# Patient Record
Sex: MALE | Race: WHITE | Employment: FULL TIME | URBAN - METROPOLITAN AREA
[De-identification: names, ages, dates, MRNs, and addresses within clinical notes are randomized per-mention and may not be internally consistent; named-entity substitution may affect disease eponyms.]

---

## 2023-01-17 ENCOUNTER — OFFICE VISIT (OUTPATIENT)
Dept: SURGERY | Facility: CLINIC | Age: 48
End: 2023-01-17

## 2023-01-17 VITALS — WEIGHT: 185 LBS | HEIGHT: 76 IN | BODY MASS INDEX: 22.53 KG/M2

## 2023-01-17 DIAGNOSIS — K40.90 RIGHT INGUINAL HERNIA: Primary | ICD-10-CM

## 2023-01-17 PROCEDURE — 3008F BODY MASS INDEX DOCD: CPT | Performed by: SURGERY

## 2023-01-17 PROCEDURE — 99204 OFFICE O/P NEW MOD 45 MIN: CPT | Performed by: SURGERY

## 2023-01-17 NOTE — PATIENT INSTRUCTIONS
Obtain preoperative testing. Plan robotic right possible left inguinal hernia repair with mesh at [facility] AND [facility] same-day surgery. Avoid aspirin and NSAIDs for 5 days prior to surgery.   Same-day surgery will call the day before with instructions

## 2023-01-26 ENCOUNTER — LAB ENCOUNTER (OUTPATIENT)
Dept: LAB | Age: 48
End: 2023-01-26
Payer: COMMERCIAL

## 2023-01-26 ENCOUNTER — EKG ENCOUNTER (OUTPATIENT)
Dept: LAB | Age: 48
End: 2023-01-26
Payer: COMMERCIAL

## 2023-01-26 DIAGNOSIS — K40.90 RIGHT INGUINAL HERNIA: ICD-10-CM

## 2023-01-26 LAB
ALBUMIN SERPL-MCNC: 4.1 G/DL (ref 3.4–5)
ALBUMIN/GLOB SERPL: 1.3 {RATIO} (ref 1–2)
ALP LIVER SERPL-CCNC: 58 U/L
ALT SERPL-CCNC: 20 U/L
ANION GAP SERPL CALC-SCNC: 6 MMOL/L (ref 0–18)
AST SERPL-CCNC: 7 U/L (ref 15–37)
ATRIAL RATE: 56 BPM
BASOPHILS # BLD AUTO: 0.03 X10(3) UL (ref 0–0.2)
BASOPHILS NFR BLD AUTO: 0.9 %
BILIRUB SERPL-MCNC: 0.5 MG/DL (ref 0.1–2)
BUN BLD-MCNC: 15 MG/DL (ref 7–18)
BUN/CREAT SERPL: 14.9 (ref 10–20)
CALCIUM BLD-MCNC: 9 MG/DL (ref 8.5–10.1)
CHLORIDE SERPL-SCNC: 106 MMOL/L (ref 98–112)
CO2 SERPL-SCNC: 29 MMOL/L (ref 21–32)
CREAT BLD-MCNC: 1.01 MG/DL
DEPRECATED RDW RBC AUTO: 39.5 FL (ref 35.1–46.3)
EOSINOPHIL # BLD AUTO: 0.06 X10(3) UL (ref 0–0.7)
EOSINOPHIL NFR BLD AUTO: 1.7 %
ERYTHROCYTE [DISTWIDTH] IN BLOOD BY AUTOMATED COUNT: 12.1 % (ref 11–15)
FASTING STATUS PATIENT QL REPORTED: NO
GFR SERPLBLD BASED ON 1.73 SQ M-ARVRAT: 92 ML/MIN/1.73M2 (ref 60–?)
GLOBULIN PLAS-MCNC: 3.1 G/DL (ref 2.8–4.4)
GLUCOSE BLD-MCNC: 98 MG/DL (ref 70–99)
HCT VFR BLD AUTO: 43.8 %
HGB BLD-MCNC: 14.4 G/DL
IMM GRANULOCYTES # BLD AUTO: 0.01 X10(3) UL (ref 0–1)
IMM GRANULOCYTES NFR BLD: 0.3 %
LYMPHOCYTES # BLD AUTO: 1.15 X10(3) UL (ref 1–4)
LYMPHOCYTES NFR BLD AUTO: 33 %
MCH RBC QN AUTO: 29.1 PG (ref 26–34)
MCHC RBC AUTO-ENTMCNC: 32.9 G/DL (ref 31–37)
MCV RBC AUTO: 88.7 FL
MONOCYTES # BLD AUTO: 0.31 X10(3) UL (ref 0.1–1)
MONOCYTES NFR BLD AUTO: 8.9 %
NEUTROPHILS # BLD AUTO: 1.93 X10 (3) UL (ref 1.5–7.7)
NEUTROPHILS # BLD AUTO: 1.93 X10(3) UL (ref 1.5–7.7)
NEUTROPHILS NFR BLD AUTO: 55.2 %
OSMOLALITY SERPL CALC.SUM OF ELEC: 293 MOSM/KG (ref 275–295)
P AXIS: 87 DEGREES
P-R INTERVAL: 158 MS
PLATELET # BLD AUTO: 172 10(3)UL (ref 150–450)
POTASSIUM SERPL-SCNC: 4.2 MMOL/L (ref 3.5–5.1)
PROT SERPL-MCNC: 7.2 G/DL (ref 6.4–8.2)
Q-T INTERVAL: 408 MS
QRS DURATION: 106 MS
QTC CALCULATION (BEZET): 393 MS
R AXIS: 48 DEGREES
RBC # BLD AUTO: 4.94 X10(6)UL
SODIUM SERPL-SCNC: 141 MMOL/L (ref 136–145)
T AXIS: 62 DEGREES
VENTRICULAR RATE: 56 BPM
WBC # BLD AUTO: 3.5 X10(3) UL (ref 4–11)

## 2023-01-26 PROCEDURE — 36415 COLL VENOUS BLD VENIPUNCTURE: CPT

## 2023-01-26 PROCEDURE — 85025 COMPLETE CBC W/AUTO DIFF WBC: CPT

## 2023-01-26 PROCEDURE — 80053 COMPREHEN METABOLIC PANEL: CPT

## 2023-01-26 PROCEDURE — 93010 ELECTROCARDIOGRAM REPORT: CPT | Performed by: INTERNAL MEDICINE

## 2023-01-26 PROCEDURE — 93005 ELECTROCARDIOGRAM TRACING: CPT

## 2023-01-30 ENCOUNTER — LAB ENCOUNTER (OUTPATIENT)
Dept: LAB | Age: 48
End: 2023-01-30
Attending: SURGERY
Payer: COMMERCIAL

## 2023-01-30 DIAGNOSIS — Z01.818 PREOP TESTING: ICD-10-CM

## 2023-01-31 LAB — SARS-COV-2 RNA RESP QL NAA+PROBE: NOT DETECTED

## 2023-02-01 RX ORDER — SILDENAFIL 25 MG/1
25 TABLET, FILM COATED ORAL
COMMUNITY

## 2023-02-02 ENCOUNTER — HOSPITAL ENCOUNTER (OUTPATIENT)
Facility: HOSPITAL | Age: 48
Setting detail: HOSPITAL OUTPATIENT SURGERY
Discharge: HOME | End: 2023-02-02
Attending: SURGERY | Admitting: SURGERY
Payer: COMMERCIAL

## 2023-02-02 ENCOUNTER — TELEPHONE (OUTPATIENT)
Dept: SURGERY | Facility: CLINIC | Age: 48
End: 2023-02-02

## 2023-02-02 ENCOUNTER — ANESTHESIA (OUTPATIENT)
Dept: SURGERY | Facility: HOSPITAL | Age: 48
End: 2023-02-02
Payer: COMMERCIAL

## 2023-02-02 ENCOUNTER — ANESTHESIA EVENT (OUTPATIENT)
Dept: SURGERY | Facility: HOSPITAL | Age: 48
End: 2023-02-02
Payer: COMMERCIAL

## 2023-02-02 VITALS
DIASTOLIC BLOOD PRESSURE: 77 MMHG | BODY MASS INDEX: 22.41 KG/M2 | TEMPERATURE: 97 F | HEART RATE: 59 BPM | RESPIRATION RATE: 14 BRPM | SYSTOLIC BLOOD PRESSURE: 126 MMHG | WEIGHT: 184 LBS | HEIGHT: 76 IN | OXYGEN SATURATION: 98 %

## 2023-02-02 DIAGNOSIS — Z01.818 PREOP TESTING: Primary | ICD-10-CM

## 2023-02-02 DIAGNOSIS — K40.20 BILATERAL INGUINAL HERNIA: ICD-10-CM

## 2023-02-02 PROCEDURE — 8E0W4CZ ROBOTIC ASSISTED PROCEDURE OF TRUNK REGION, PERCUTANEOUS ENDOSCOPIC APPROACH: ICD-10-PCS | Performed by: SURGERY

## 2023-02-02 PROCEDURE — 0YUA4JZ SUPPLEMENT BILATERAL INGUINAL REGION WITH SYNTHETIC SUBSTITUTE, PERCUTANEOUS ENDOSCOPIC APPROACH: ICD-10-PCS | Performed by: SURGERY

## 2023-02-02 PROCEDURE — 49650 LAP ING HERNIA REPAIR INIT: CPT | Performed by: SURGERY

## 2023-02-02 DEVICE — PROGRIP MESH: Type: IMPLANTABLE DEVICE | Site: INGUINAL | Status: FUNCTIONAL

## 2023-02-02 RX ORDER — HYDROMORPHONE HYDROCHLORIDE 1 MG/ML
0.6 INJECTION, SOLUTION INTRAMUSCULAR; INTRAVENOUS; SUBCUTANEOUS EVERY 5 MIN PRN
Status: DISCONTINUED | OUTPATIENT
Start: 2023-02-02 | End: 2023-02-02

## 2023-02-02 RX ORDER — MORPHINE SULFATE 10 MG/ML
6 INJECTION, SOLUTION INTRAMUSCULAR; INTRAVENOUS EVERY 10 MIN PRN
Status: DISCONTINUED | OUTPATIENT
Start: 2023-02-02 | End: 2023-02-02

## 2023-02-02 RX ORDER — ROCURONIUM BROMIDE 10 MG/ML
INJECTION, SOLUTION INTRAVENOUS AS NEEDED
Status: DISCONTINUED | OUTPATIENT
Start: 2023-02-02 | End: 2023-02-02 | Stop reason: SURG

## 2023-02-02 RX ORDER — BUPIVACAINE HYDROCHLORIDE 2.5 MG/ML
INJECTION, SOLUTION EPIDURAL; INFILTRATION; INTRACAUDAL AS NEEDED
Status: DISCONTINUED | OUTPATIENT
Start: 2023-02-02 | End: 2023-02-02

## 2023-02-02 RX ORDER — DEXAMETHASONE SODIUM PHOSPHATE 4 MG/ML
VIAL (ML) INJECTION AS NEEDED
Status: DISCONTINUED | OUTPATIENT
Start: 2023-02-02 | End: 2023-02-02 | Stop reason: SURG

## 2023-02-02 RX ORDER — HYDROMORPHONE HYDROCHLORIDE 1 MG/ML
0.4 INJECTION, SOLUTION INTRAMUSCULAR; INTRAVENOUS; SUBCUTANEOUS EVERY 5 MIN PRN
Status: DISCONTINUED | OUTPATIENT
Start: 2023-02-02 | End: 2023-02-02

## 2023-02-02 RX ORDER — ACETAMINOPHEN 500 MG
1000 TABLET ORAL ONCE
Status: COMPLETED | OUTPATIENT
Start: 2023-02-02 | End: 2023-02-02

## 2023-02-02 RX ORDER — KETOROLAC TROMETHAMINE 30 MG/ML
INJECTION, SOLUTION INTRAMUSCULAR; INTRAVENOUS AS NEEDED
Status: DISCONTINUED | OUTPATIENT
Start: 2023-02-02 | End: 2023-02-02 | Stop reason: SURG

## 2023-02-02 RX ORDER — NEOSTIGMINE METHYLSULFATE 1 MG/ML
INJECTION, SOLUTION INTRAVENOUS AS NEEDED
Status: DISCONTINUED | OUTPATIENT
Start: 2023-02-02 | End: 2023-02-02 | Stop reason: SURG

## 2023-02-02 RX ORDER — IBUPROFEN 600 MG/1
600 TABLET ORAL EVERY 6 HOURS PRN
Qty: 15 TABLET | Refills: 1 | Status: SHIPPED | OUTPATIENT
Start: 2023-02-02 | End: 2023-02-09

## 2023-02-02 RX ORDER — ONDANSETRON 2 MG/ML
INJECTION INTRAMUSCULAR; INTRAVENOUS AS NEEDED
Status: DISCONTINUED | OUTPATIENT
Start: 2023-02-02 | End: 2023-02-02 | Stop reason: SURG

## 2023-02-02 RX ORDER — PROCHLORPERAZINE EDISYLATE 5 MG/ML
5 INJECTION INTRAMUSCULAR; INTRAVENOUS EVERY 8 HOURS PRN
Status: DISCONTINUED | OUTPATIENT
Start: 2023-02-02 | End: 2023-02-02

## 2023-02-02 RX ORDER — SODIUM CHLORIDE, SODIUM LACTATE, POTASSIUM CHLORIDE, CALCIUM CHLORIDE 600; 310; 30; 20 MG/100ML; MG/100ML; MG/100ML; MG/100ML
INJECTION, SOLUTION INTRAVENOUS CONTINUOUS
Status: DISCONTINUED | OUTPATIENT
Start: 2023-02-02 | End: 2023-02-02

## 2023-02-02 RX ORDER — GLYCOPYRROLATE 0.2 MG/ML
INJECTION, SOLUTION INTRAMUSCULAR; INTRAVENOUS AS NEEDED
Status: DISCONTINUED | OUTPATIENT
Start: 2023-02-02 | End: 2023-02-02 | Stop reason: SURG

## 2023-02-02 RX ORDER — CEFAZOLIN SODIUM/WATER 2 G/20 ML
2 SYRINGE (ML) INTRAVENOUS ONCE
Status: COMPLETED | OUTPATIENT
Start: 2023-02-02 | End: 2023-02-02

## 2023-02-02 RX ORDER — NALOXONE HYDROCHLORIDE 0.4 MG/ML
80 INJECTION, SOLUTION INTRAMUSCULAR; INTRAVENOUS; SUBCUTANEOUS AS NEEDED
Status: DISCONTINUED | OUTPATIENT
Start: 2023-02-02 | End: 2023-02-02

## 2023-02-02 RX ORDER — LIDOCAINE HYDROCHLORIDE 10 MG/ML
INJECTION, SOLUTION EPIDURAL; INFILTRATION; INTRACAUDAL; PERINEURAL AS NEEDED
Status: DISCONTINUED | OUTPATIENT
Start: 2023-02-02 | End: 2023-02-02 | Stop reason: SURG

## 2023-02-02 RX ORDER — MORPHINE SULFATE 4 MG/ML
2 INJECTION, SOLUTION INTRAMUSCULAR; INTRAVENOUS EVERY 10 MIN PRN
Status: DISCONTINUED | OUTPATIENT
Start: 2023-02-02 | End: 2023-02-02

## 2023-02-02 RX ORDER — MORPHINE SULFATE 4 MG/ML
4 INJECTION, SOLUTION INTRAMUSCULAR; INTRAVENOUS EVERY 10 MIN PRN
Status: DISCONTINUED | OUTPATIENT
Start: 2023-02-02 | End: 2023-02-02

## 2023-02-02 RX ORDER — MIDAZOLAM HYDROCHLORIDE 1 MG/ML
INJECTION INTRAMUSCULAR; INTRAVENOUS AS NEEDED
Status: DISCONTINUED | OUTPATIENT
Start: 2023-02-02 | End: 2023-02-02 | Stop reason: SURG

## 2023-02-02 RX ORDER — HYDROCODONE BITARTRATE AND ACETAMINOPHEN 5; 325 MG/1; MG/1
1 TABLET ORAL EVERY 6 HOURS PRN
Qty: 10 TABLET | Refills: 0 | Status: SHIPPED | OUTPATIENT
Start: 2023-02-02

## 2023-02-02 RX ORDER — ONDANSETRON 2 MG/ML
4 INJECTION INTRAMUSCULAR; INTRAVENOUS EVERY 6 HOURS PRN
Status: DISCONTINUED | OUTPATIENT
Start: 2023-02-02 | End: 2023-02-02

## 2023-02-02 RX ORDER — HYDROMORPHONE HYDROCHLORIDE 1 MG/ML
0.2 INJECTION, SOLUTION INTRAMUSCULAR; INTRAVENOUS; SUBCUTANEOUS EVERY 5 MIN PRN
Status: DISCONTINUED | OUTPATIENT
Start: 2023-02-02 | End: 2023-02-02

## 2023-02-02 RX ADMIN — NEOSTIGMINE METHYLSULFATE 5 MG: 1 INJECTION, SOLUTION INTRAVENOUS at 08:28:00

## 2023-02-02 RX ADMIN — KETOROLAC TROMETHAMINE 30 MG: 30 INJECTION, SOLUTION INTRAMUSCULAR; INTRAVENOUS at 08:18:00

## 2023-02-02 RX ADMIN — SODIUM CHLORIDE, SODIUM LACTATE, POTASSIUM CHLORIDE, CALCIUM CHLORIDE: 600; 310; 30; 20 INJECTION, SOLUTION INTRAVENOUS at 07:22:00

## 2023-02-02 RX ADMIN — DEXAMETHASONE SODIUM PHOSPHATE 4 MG: 4 MG/ML VIAL (ML) INJECTION at 07:40:00

## 2023-02-02 RX ADMIN — MIDAZOLAM HYDROCHLORIDE 2 MG: 1 INJECTION INTRAMUSCULAR; INTRAVENOUS at 07:22:00

## 2023-02-02 RX ADMIN — ONDANSETRON 4 MG: 2 INJECTION INTRAMUSCULAR; INTRAVENOUS at 07:40:00

## 2023-02-02 RX ADMIN — SODIUM CHLORIDE, SODIUM LACTATE, POTASSIUM CHLORIDE, CALCIUM CHLORIDE: 600; 310; 30; 20 INJECTION, SOLUTION INTRAVENOUS at 08:37:00

## 2023-02-02 RX ADMIN — GLYCOPYRROLATE 1 MG: 0.2 INJECTION, SOLUTION INTRAMUSCULAR; INTRAVENOUS at 08:28:00

## 2023-02-02 RX ADMIN — ROCURONIUM BROMIDE 50 MG: 10 INJECTION, SOLUTION INTRAVENOUS at 07:27:00

## 2023-02-02 RX ADMIN — CEFAZOLIN SODIUM/WATER 2 G: 2 G/20 ML SYRINGE (ML) INTRAVENOUS at 07:33:00

## 2023-02-02 RX ADMIN — LIDOCAINE HYDROCHLORIDE 50 MG: 10 INJECTION, SOLUTION EPIDURAL; INFILTRATION; INTRACAUDAL; PERINEURAL at 07:27:00

## 2023-02-02 NOTE — ANESTHESIA PROCEDURE NOTES
Airway  Date/Time: 2/2/2023 7:28 AM  Urgency: Elective      General Information and Staff    Patient location during procedure: OR  Anesthesiologist: Mehran Truong MD  Resident/CRNA: Slava Snyder CRNA  Performed: CRNA     Indications and Patient Condition  Indications for airway management: anesthesia  Sedation level: deep  Preoxygenated: yes  Patient position: sniffing  Mask difficulty assessment: 2 - vent by mask + OA or adjuvant +/- NMBA    Final Airway Details  Final airway type: endotracheal airway      Successful airway: ETT  Cuffed: yes   Successful intubation technique: direct laryngoscopy  Endotracheal tube insertion site: oral  Blade: Ariel  Blade size: #4  ETT size (mm): 8.0    Cormack-Lehane Classification: grade IIA - partial view of glottis  Placement verified by: chest auscultation and capnometry   Measured from: teeth  ETT to teeth (cm): 22  Number of attempts at approach: 1

## 2023-02-02 NOTE — INTERVAL H&P NOTE
Pre-op Diagnosis: Right inguinal hernia [K40.90]    The above referenced H&P was reviewed by Britt Noyola MD on 2/2/2023, the patient was examined and no significant changes have occurred in the patient's condition since the H&P was performed. I discussed with the patient and/or legal representative the potential benefits, risks and side effects of this procedure; the likelihood of the patient achieving goals; and potential problems that might occur during recuperation. I discussed reasonable alternatives to the procedure, including risks, benefits and side effects related to the alternatives and risks related to not receiving this procedure. We will proceed with procedure as planned.

## 2023-02-02 NOTE — DISCHARGE INSTRUCTIONS
Ice pack as needed. May shower in 24 hours. 15 pound lifting restriction for weeks. May drive in 1 week or when pain-free  Wear scrotal support or boxer briefs for 1 week to minimize swelling  Ibuprofen 600 mg every 6 hours for pain. Norco for breakthrough pain  Norco can be constipating take milk of magnesia if needed  Follow-up 2 weeks  Remove Band-Aids in 24 hours leave white strips for 1 week     HOME INSTRUCTIONS  [facility] HOME CARE INSTRUCTIONS: POST-OP ANESTHESIA  The medication that you received for sedation or general anesthesia can last up to 24 hours. Your judgment and reflexes may be altered, even if you feel like your normal self. We Recommend:   Do not drive any motor vehicle or bicycle   Avoid mowing the lawn, playing sports, or working with power tools/applicances (power saws, electric knives or mixers)   That you have someone stay with you on your first night home   Do not drink alcohol or take sleeping pills or tranquilizers   Do not sign legal documents within 24 hours of your procedure   If you had a nerve block for your surgery, take extra care not to put any pressure on your arm or hand for 24 hours    It is normal:  For you to have a sore throat if you had a breathing tube during surgery (while you were asleep!). The sore throat should get better within 48 hours. You can gargle with warm salt water (1/2 tsp in 4 oz warm water) or use a throat lozenge for comfort  To feel muscle aches or soreness especially in the abdomen, chest or neck. The achy feeling should go away in the next 24 hours  To feel weak, sleepy or \"wiped out\". Your should start feeling better in the next 24 hours. To experience mild discomforts such as sore lip or tongue, headache, cramps, gas pains or a bloated feeling in your abdomen. To experience mild back pain or soreness for a day or two if you had spinal or epidural anesthesia.    If you had laparoscopic surgery, to feel shoulder pain or discomfort on the day of surgery. For some patients to have nausea after surgery/anesthesia    If you feel nausea or experience vomiting:   Try to move around less.    Eat less than usual or drink only liquids until the next morning   Nausea should resolve in about 24 hours    If you have a problem when you are at home:    Call your surgeons office

## 2023-02-02 NOTE — ANESTHESIA POSTPROCEDURE EVALUATION
Patient: Angy Walton    Procedure Summary     Date: 02/02/23 Room / Location: [facility] / [address] [place] [address] MAIN OR    Anesthesia Start: 0315 Anesthesia Stop: 0840    Procedure: Robotic Right Inguinal Hernia repair with mesh and  robotic Left Inguinal repair with mesh (Bilateral: Abdomen) Diagnosis:       Right inguinal hernia      (Right inguinal hernia [K40.90])    Surgeons: Xochitl Palacios MD Anesthesiologist: Mehran Truong MD    Anesthesia Type: general ASA Status: 2          Anesthesia Type: general    Vitals Value Taken Time   /99 02/02/23 0837   Temp 97 02/02/23 0841   Pulse 63 02/02/23 0840   Resp 13 02/02/23 0840   SpO2 100 % 02/02/23 0840   Vitals shown include unvalidated device data.     [address] [place] [address] AN Post Evaluation:   Patient Evaluated in PACU  Patient Participation: complete - patient participated  Level of Consciousness: awake and alert  Pain Score: 5  Pain Management: adequate  Airway Patency:patent  Dental exam unchanged from preop  Yes    Cardiovascular Status: acceptable  Respiratory Status: acceptable and nasal cannula  Postoperative Hydration acceptable      Edgardo Gaines CRNA  2/2/2023 8:41 AM

## 2023-02-02 NOTE — TELEPHONE ENCOUNTER
Patient requesting medication to be sent to pharmacy below.  Please advise       [facility] [address], [address] [place] [address], [phone]    HYDROcodone-acetaminophen 5-325 MG Oral Tab

## 2023-02-03 NOTE — OPERATIVE REPORT
[facility]    PATIENT'S NAME: David Morgan   ATTENDING PHYSICIAN: Niecy Angela MD   OPERATING PHYSICIAN: Niecy Angela MD   PATIENT ACCOUNT#:   [de-identified]    LOCATION:  [place] [address]  MEDICAL RECORD #:   [id number]       YOB: 1975  ADMISSION DATE:       02/02/2023      OPERATION DATE:  02/02/2023    OPERATIVE REPORT    PREOPERATIVE DIAGNOSIS:  Right possible left inguinal hernia. POSTOPERATIVE DIAGNOSIS:  Bilateral direct inguinal hernia. PROCEDURE:  Robotic repair of bilateral direct inguinal hernia with ProGrip mesh. ASSISTANT:  RIZWAN Hill    ESTIMATED BLOOD LOSS:  Minimal.    COMPLICATIONS:  None. ANESTHESIA:  General.    DISPOSITION:  To Recovery, tolerated well. INDICATIONS:  The patient is a fit 69-year-old with the above complaint. Consent obtained. OPERATIVE TECHNIQUE:  He was taken to surgery. He was prepped and draped in usual sterile fashion. Veress needle placed at [place] point. Abdomen insufflated. An 8 mm trocar placed using Optiview. Two additional 8 mm trocars were placed and the robot docked. Exploration reveals a large direct defect on the right and a smaller direct defect on the left. The right side is approached first.  The peritoneal flaps are elevated widely. The spermatic cord is dissected freely from the sac. There is no indirect defect. The large hernia sac is dissected. Some preperitoneal fat is removed. A 10 x 15 ProGrip is placed in the preperitoneal space, opened and secured in place. The peritoneal flap closed using running V-Loc. Identical repair is performed on the left. Trocars removed. The skin was closed with 3-0 Monocryl. Marcaine infiltrated for local block. He tolerated this well.     Dictated By Niecy Angela MD  d: 02/02/2023 31:34:18  t: 02/02/2023 12:17:03  Job [id number]  GL/    cc: Dr. Que Pack

## 2023-02-03 NOTE — TELEPHONE ENCOUNTER
RC -  2-3-23. Per patient just received a message from pharmacy that RX was ready. Patient reports he is urinating and has had one small-soft  BM. Patient has pain but been using Ibuprofen. Patient denies fever. Post op appt made for 2/21/23 in OP at 8:15.

## 2023-02-03 NOTE — TELEPHONE ENCOUNTER
Pt's spouse calling again, asking to be sent to [facility] in [facility].  Please advise

## 2023-02-09 NOTE — TELEPHONE ENCOUNTER
Pt calling to clarify when he is supposed to be seen. Was under impression was to be seen on 2/10/23.  Please advise

## 2023-02-15 ENCOUNTER — TELEPHONE (OUTPATIENT)
Dept: SURGERY | Facility: CLINIC | Age: 48
End: 2023-02-15

## 2023-02-15 NOTE — TELEPHONE ENCOUNTER
Message noted. Yes please cancel the [employer]. if the patient was able to get the Tylenol   Thanks, Vianca Cardozo

## 2023-02-21 ENCOUNTER — OFFICE VISIT (OUTPATIENT)
Dept: SURGERY | Facility: CLINIC | Age: 48
End: 2023-02-21

## 2023-02-21 VITALS — BODY MASS INDEX: 22.41 KG/M2 | WEIGHT: 184 LBS | HEIGHT: 76 IN

## 2023-02-21 DIAGNOSIS — Z48.89 ENCOUNTER FOR POSTOPERATIVE WOUND CHECK: Primary | ICD-10-CM

## 2023-02-21 PROCEDURE — 3008F BODY MASS INDEX DOCD: CPT | Performed by: SURGERY

## 2023-02-21 PROCEDURE — 99212 OFFICE O/P EST SF 10 MIN: CPT | Performed by: SURGERY

## 2023-03-08 ENCOUNTER — TELEPHONE (OUTPATIENT)
Dept: SURGERY | Facility: CLINIC | Age: 48
End: 2023-03-08

## 2023-03-08 NOTE — TELEPHONE ENCOUNTER
Medication substituted from [address] [place] [address],[address] to [facility]

## 2023-03-08 NOTE — TELEPHONE ENCOUNTER
[facility] requesting medication clarification.  Would like to know if the Acetaminophen Tylenol is replacing to Norco. Please advise

## (undated) DEVICE — CANNULA SEAL

## (undated) DEVICE — ARM DRAPE

## (undated) DEVICE — SUT MONOCRYL 3-0 PS-2 Y497G

## (undated) DEVICE — TUBING MEGADYNE LAPAROSCOPIC

## (undated) DEVICE — APPLICATOR CHLORAPREP 26ML

## (undated) DEVICE — SYSTEM SURGYPAD VELCRO 36IN

## (undated) DEVICE — DRAPE SHEET LAPCHOLE 124X100X7

## (undated) DEVICE — TIP COVER ACCESSORY

## (undated) DEVICE — GOWN SURG AERO BLUE PERF LG

## (undated) DEVICE — MEGA SUTURECUT ND: Brand: ENDOWRIST

## (undated) DEVICE — BLADELESS OBTURATOR: Brand: WECK VISTA

## (undated) DEVICE — LAPAROVUE VISIBILITY SYSTEM LAPAROSCOPIC SOLUTIONS: Brand: LAPAROVUE

## (undated) DEVICE — SUT SILK 2-0 SH K833H

## (undated) DEVICE — DEVICE PORT SITE CLOSURE

## (undated) DEVICE — FENESTRATED BIPOLAR FORCEPS: Brand: ENDOWRIST

## (undated) DEVICE — ROBOTIC: Brand: MEDLINE INDUSTRIES, INC.

## (undated) DEVICE — MONOPOLAR CURVED SCISSORS: Brand: ENDOWRIST

## (undated) DEVICE — INSUFFLATION NEEDLE TO ESTABLISH PNEUMOPERITONEUM.: Brand: INSUFFLATION NEEDLE

## (undated) DEVICE — SOL NACL IRRIG 0.9% 1000ML BTL

## (undated) DEVICE — COLUMN DRAPE

## (undated) DEVICE — GAMMEX® PI HYBRID SIZE 7, STERILE POWDER-FREE SURGICAL GLOVE, POLYISOPRENE AND NEOPRENE BLEND: Brand: GAMMEX

## (undated) DEVICE — C-ARM: Brand: UNBRANDED

## (undated) DEVICE — SUTURE VLOC 90 2-0 9" 2145